# Patient Record
Sex: OTHER/UNKNOWN | Employment: FULL TIME | URBAN - METROPOLITAN AREA
[De-identification: names, ages, dates, MRNs, and addresses within clinical notes are randomized per-mention and may not be internally consistent; named-entity substitution may affect disease eponyms.]

---

## 2023-03-07 ENCOUNTER — OFFICE VISIT (OUTPATIENT)
Dept: FAMILY MEDICINE | Facility: CLINIC | Age: 88
End: 2023-03-07

## 2023-03-07 DIAGNOSIS — Z02.89 ENCOUNTER FOR EXAMINATION REQUIRED BY DEPARTMENT OF TRANSPORTATION (DOT): Primary | ICD-10-CM

## 2023-03-07 LAB
BILIRUB UR QL: ABNORMAL
CLARITY: ABNORMAL
COLOR UR: YELLOW
GLUCOSE URINE: ABNORMAL MG/DL
HGB UR QL: ABNORMAL
KETONES UR QL: ABNORMAL MG/DL
NITRITE UR QL STRIP: ABNORMAL
PH UR STRIP: 8 PH (ref 5–7)
PROT UR QL: ABNORMAL MG/DL
SP GR UR STRIP: 1.01 (ref 1–1.03)
SPECIMEN VOL UR: ABNORMAL ML
UROBILINOGEN UR QL STRIP: 0.2 EU/DL (ref 0.2–1)
WBC #/AREA URNS HPF: ABNORMAL /[HPF]

## 2023-03-07 PROCEDURE — 81003 URINALYSIS AUTO W/O SCOPE: CPT | Performed by: NURSE PRACTITIONER

## 2023-03-07 PROCEDURE — 99499 UNLISTED E&M SERVICE: CPT | Performed by: NURSE PRACTITIONER

## 2023-03-07 NOTE — PROGRESS NOTES
Form [id number]                                  [id number]  Expiration Date: 3/31/2025  MEDICAL EXAMINATION REPORT FORM  (FOR  MEDICAL CERTIFICATION)    SECTION 1.  Information (to be filled out by )    PERSONAL INFORMATION    Last Name: ANGELA  First Name: LORE Rascon Initial: KEISHA  : 1977      Age: 45        Street Address: [address] [place] [address] [facility]   City: [place]   State/Province: [place]   Zip Code: [address]  's License Number: [id number]      Issuing State/Province: [place]       Phone: [phone]       E-mail (optional):   CLP/CDL Applicant Cai*: no   ID Verified by**:  RMB/CMA  Has your [employer] medical certificate ever been denied or issued for less than 2 years? NO       TESTING       Blood Pressure  Blood Pressure: 124 Systolic  76 Diastolic  Sitting   Second Reading (optional)   Other Testing if indicated    N/A       Urinalysis  Urinalysis is required. Numerical readings must be recorded.  Urine Specimen Specific Gravity Protein Blood Sugar    1.015 NEGATIVE NEGATIVE NEGATIVE   Protein, blood or sugar in the urine may be an indication for further testing to rule out any underlying medical problem.           PHYSICAL EXAMINATION  The presence of a certain condition may not necessarily disqualify a , particularly if the condition is controlled adequately, is not likely to worsen, or is readily amenable to treatment. Even if a condition does not disqualify a , the Medical Examiner may consider deferring the  temporarily. Also, the  should be advised to take the necessary steps to correct the condition as soon as possible, particularly if neglecting the condition, could result in more serious illness that might affect driving.      Discuss any abnormal answers in detail in the space below and indicate whether it would affect the 's ability to operate a CMV. Enter applicable item number before each comment.    SEE  SCANNED FORMS            Please complete only one of the following (Federal or State) Medical Examiner Determination sections:      MEDICAL EXAMINER DETERMINATION (Federal)  Use this section for examinations performed in accordance with the Federal Motor Carrier Safety Regulations (49 ..49):    [  ] Does not meet standards (specify reason) ________________________________________________________________________________  [  ] Meets standards in 49 .41; qualifies for 2-year certificate  [  ] Meets standards, but periodic monitoring required (specify reason) ___________________________________________________________         qualified for:   [  ] 3 months               [  ] 6 months               [  ] 1 year            [  ] other (specify): ________________________________  [  ]  Wearing corrective lenses        [  ] Wearing hearing aid        [  ] Accompanied by a waiver/exception(specify type): ____________________  [  ] Accompanied by a Skill Performance Evaluation (SPE) Certificate    [  ]  Qualified by operation of 49 .64 (Federal)  [  ] Driving within an exempt intracity zone (see 49 .62) (Federal)  [  ] Determination pending (specify reason) __________________________________________________________________________________        [  ] Return to medical exam office for follow-up on (must be 45 days or less _______________________________        [  ] Medical Examination Report amended (specify reason) ______________________________________________                    (if amended) Medical Examiner's Signature _____________________________________________________ Date: _______________  [  ] Incomplete examination (specify reason): _________________________________________________________________________________            If the  meets the standards outlined in 49 .41, then complete a Medical Examiner's Certificate as stated in 49 .43(h), as appropriate.      I have performed this evaluation for certification. I have personally reviewed all available records and recorded information pertaining to this evaluation, and attest that to the best of my knowledge, I believe it to be true and correct.    Medical Examiner's Signature: _________________________________________                                                                                   (if printed)    Medical Examiner's Name: Edie Breen NP  Medical Examiner's Address:   [address] [address]  [phone]  Dept: [phone]    Date Certificate Signed: 3/7/23    Medical Examiner's State License, Certificate, or Registration Number: [id number]    Issuing State:  [place]    [  ] M.D.   [  ] D.O.   [  ] Physician Assistant   [  ] Chiropractor   [ x ] Advanced Practice Nurse  [  ] Other Practitioner (specify) __________________________________________________    National Registry Number:  [id number]     Medical Examiner's Certificate Expiration Date: 3/7/25                               If the  meets the standards outlined in 49 .41, with applicable State variances, then complete a Medical Examiner's Certificate, as appropriate.     I have performed this evaluation for certification. I have personally reviewed all available records and recorded information pertaining to this evaluation, and attest that to the best of my knowledge, I believe it to be true and correct.    Medical Examiner's Signature: _________________________________________                                                                                   (if printed)    Medical Examiner's Name: LIZZY Martínez  Medical Examiner's Address:   [address] [address]  [phone]    Date Certificate Signed: 3/7/23    Medical Examiner's State License, Certificate, or Registration Number:  [id number]   Issuing State:  [place]    [   ] M.D.   [  ] D.OBoone   [  ] Physician Assistant   [  ] Chiropractor   [ x ] Advanced Practice Nurse  [  ] Other Practitioner (specify) __________________________________________________    National Registry Number:  [id number]               Medical Examiner's Certificate Expiration Date: 3/7/25                      Date submitted to registry: 3/7/25  Submitted by: KAMI